# Patient Record
Sex: FEMALE | Race: AMERICAN INDIAN OR ALASKA NATIVE
[De-identification: names, ages, dates, MRNs, and addresses within clinical notes are randomized per-mention and may not be internally consistent; named-entity substitution may affect disease eponyms.]

---

## 2020-02-28 ENCOUNTER — HOSPITAL ENCOUNTER (EMERGENCY)
Dept: HOSPITAL 5 - ED | Age: 30
Discharge: HOME | End: 2020-02-28
Payer: COMMERCIAL

## 2020-02-28 VITALS — SYSTOLIC BLOOD PRESSURE: 112 MMHG | DIASTOLIC BLOOD PRESSURE: 58 MMHG

## 2020-02-28 DIAGNOSIS — R53.1: ICD-10-CM

## 2020-02-28 DIAGNOSIS — R42: Primary | ICD-10-CM

## 2020-02-28 LAB
ALBUMIN SERPL-MCNC: 4 G/DL (ref 3.9–5)
ALT SERPL-CCNC: 16 UNITS/L (ref 7–56)
BASOPHILS # (AUTO): 0 K/MM3 (ref 0–0.1)
BASOPHILS NFR BLD AUTO: 0.5 % (ref 0–1.8)
BUN SERPL-MCNC: 12 MG/DL (ref 7–17)
BUN/CREAT SERPL: 20 %
CALCIUM SERPL-MCNC: 9.1 MG/DL (ref 8.4–10.2)
EOSINOPHIL # BLD AUTO: 0 K/MM3 (ref 0–0.4)
EOSINOPHIL NFR BLD AUTO: 0.9 % (ref 0–4.3)
HCT VFR BLD CALC: 38 % (ref 30.3–42.9)
HEMOLYSIS INDEX: 45
HGB BLD-MCNC: 12.3 GM/DL (ref 10.1–14.3)
LYMPHOCYTES # BLD AUTO: 1.4 K/MM3 (ref 1.2–5.4)
LYMPHOCYTES NFR BLD AUTO: 30.8 % (ref 13.4–35)
MCHC RBC AUTO-ENTMCNC: 33 % (ref 30–34)
MCV RBC AUTO: 88 FL (ref 79–97)
MONOCYTES # (AUTO): 0.3 K/MM3 (ref 0–0.8)
MONOCYTES % (AUTO): 7.2 % (ref 0–7.3)
PLATELET # BLD: 236 K/MM3 (ref 140–440)
RBC # BLD AUTO: 4.33 M/MM3 (ref 3.65–5.03)

## 2020-02-28 PROCEDURE — 93005 ELECTROCARDIOGRAM TRACING: CPT

## 2020-02-28 PROCEDURE — 80053 COMPREHEN METABOLIC PANEL: CPT

## 2020-02-28 PROCEDURE — 84703 CHORIONIC GONADOTROPIN ASSAY: CPT

## 2020-02-28 PROCEDURE — 93010 ELECTROCARDIOGRAM REPORT: CPT

## 2020-02-28 PROCEDURE — 85025 COMPLETE CBC W/AUTO DIFF WBC: CPT

## 2020-02-28 PROCEDURE — 36415 COLL VENOUS BLD VENIPUNCTURE: CPT

## 2020-02-28 PROCEDURE — 99283 EMERGENCY DEPT VISIT LOW MDM: CPT

## 2020-02-28 NOTE — EVENT NOTE
ED Screening Note


ED Screening Note: 


3 days lightheadededness fatigue





This initial assessment/diagnostic orders/clinical plan/treatment(s) is/are 

subject to change based on patients health status, clinical progression and re-

assessment by fellow clinical providers in the ED. Further treatment and workup 

at subsequent clinical providers discretion. Patient/guardian urged not to elope

from the ED as their condition may be serious if not clinically assessed and 

managed. 





Initial orders include: 


labs

## 2020-02-28 NOTE — EMERGENCY DEPARTMENT REPORT
ED General Adult HPI





- General


Chief complaint: Weakness


Stated complaint: WEAK/LIGHT HEADED


Time Seen by Provider: 02/28/20 11:38


Source: patient


Mode of arrival: Ambulatory


Limitations: No Limitations





- History of Present Illness


Initial comments: 





29-year-old American female  presents to the emergency 

department complaining of a few day history of waxing and waning presyncope 

symptoms.  States she been feeling, weak and does not notice that she gets 

little lightheaded and sometimes some dizziness with position change and is 

worried that her electrolytes are off for see if she is anemic.  She reports no 

no bleeding.  Ports no chest pain, palpitations, nausea, vomiting, fever, 

chills, sweats no shortness of breath.  She reports no new


New medications no pre-existing health conditions.  She reports no illicit drug 

use


Radiation: non-radiation


Quality: dull


Consistency: constant


Improves with: none


Worsens with: none


Associated Symptoms: denies: diaphoresis, fever/chills, loss of appetite, 

malaise, nausea/vomiting, shortness of breath, syncope, weakness


Treatments Prior to Arrival: none





- Related Data


                                    Allergies











Allergy/AdvReac Type Severity Reaction Status Date / Time


 


No Known Allergies Allergy   Unverified 02/28/20 11:07














ED Review of Systems


ROS: 


Stated complaint: WEAK/LIGHT HEADED


Other details as noted in HPI





Comment: All other systems reviewed and negative





ED Past Medical Hx





- Past Medical History


Previous Medical History?: No





- Surgical History


Additional Surgical History: csection





- Social History


Smoking Status: Never Smoker


Substance Use Type: None





ED Physical Exam





- General


Limitations: No Limitations


General appearance: alert, in no apparent distress





- Head


Head exam: Present: atraumatic, normocephalic





- Eye


Eye exam: Present: normal appearance, PERRL, EOMI


Pupils: Present: normal accommodation





- ENT


ENT exam: Present: normal exam, normal orophraynx, mucous membranes moist





- Neck


Neck exam: Present: normal inspection, full ROM





- Respiratory


Respiratory exam: Present: normal lung sounds bilaterally.  Absent: respiratory 

distress, wheezes, rales, chest wall tenderness, accessory muscle use





- Cardiovascular


Cardiovascular Exam: Present: regular rate, normal rhythm.  Absent: systolic 

murmur, diastolic murmur, rubs, gallop





- GI/Abdominal


GI/Abdominal exam: Present: soft, normal bowel sounds.  Absent: distended, 

tenderness, hyperactive bowel sounds





- Extremities Exam


Extremities exam: Present: normal inspection





- Back Exam


Back exam: Present: normal inspection.  Absent: CVA tenderness (R), CVA 

tenderness (L)





- Neurological Exam


Neurological exam: Present: alert, oriented X3, CN II-XII intact, normal gait.  

Absent: motor sensory deficit





- Psychiatric


Psychiatric exam: Present: normal affect, normal mood





- Skin


Skin exam: Present: warm, dry, intact, normal color.  Absent: rash





ED Course





                                   Vital Signs











  02/28/20 02/28/20





  11:11 12:23


 


Temperature 99.1 F 


 


Pulse Rate 80 


 


Respiratory 18 18





Rate  


 


Blood Pressure 113/64 


 


O2 Sat by Pulse 100 99





Oximetry  














ED Medical Decision Making





- Lab Data


Result diagrams: 


                                 02/28/20 11:58





                                 02/28/20 11:58








                                   Lab Results











  02/28/20 02/28/20 02/28/20 Range/Units





  11:58 11:58 11:58 


 


WBC  4.5    (4.5-11.0)  K/mm3


 


RBC  4.33    (3.65-5.03)  M/mm3


 


Hgb  12.3    (10.1-14.3)  gm/dl


 


Hct  38.0    (30.3-42.9)  %


 


MCV  88    (79-97)  fl


 


MCH  29    (28-32)  pg


 


MCHC  33    (30-34)  %


 


RDW  13.4    (13.2-15.2)  %


 


Plt Count  236    (140-440)  K/mm3


 


Lymph % (Auto)  30.8    (13.4-35.0)  %


 


Mono % (Auto)  7.2    (0.0-7.3)  %


 


Eos % (Auto)  0.9    (0.0-4.3)  %


 


Baso % (Auto)  0.5    (0.0-1.8)  %


 


Lymph #  1.4    (1.2-5.4)  K/mm3


 


Mono #  0.3    (0.0-0.8)  K/mm3


 


Eos #  0.0    (0.0-0.4)  K/mm3


 


Baso #  0.0    (0.0-0.1)  K/mm3


 


Seg Neutrophils %  60.6    (40.0-70.0)  %


 


Seg Neutrophils #  2.7    (1.8-7.7)  K/mm3


 


Sodium   137   (137-145)  mmol/L


 


Potassium   4.6   (3.6-5.0)  mmol/L


 


Chloride   101.7   ()  mmol/L


 


Carbon Dioxide   21 L   (22-30)  mmol/L


 


Anion Gap   19   mmol/L


 


BUN   12   (7-17)  mg/dL


 


Creatinine   0.6 L   (0.7-1.2)  mg/dL


 


Estimated GFR   > 60   ml/min


 


BUN/Creatinine Ratio   20   %


 


Glucose   87   ()  mg/dL


 


Calcium   9.1   (8.4-10.2)  mg/dL


 


Total Bilirubin   0.40   (0.1-1.2)  mg/dL


 


AST   19   (5-40)  units/L


 


ALT   16   (7-56)  units/L


 


Alkaline Phosphatase   56   ()  units/L


 


Total Protein   7.5   (6.3-8.2)  g/dL


 


Albumin   4.0   (3.9-5)  g/dL


 


Albumin/Globulin Ratio   1.1   %


 


HCG, Qual    Negative  (Negative)  














- EKG Data


When compared to previous EKG there are: no significant change


Interpretation: normal EKG


Critical care attestation.: 


If time is entered above; I have spent that time in minutes in the direct care 

of this critically ill patient, excluding procedure time.








ED Disposition


Clinical Impression: 


 Dizziness





Disposition: DC-01 TO HOME OR SELFCARE


Is pt being admited?: No


Does the pt Need Aspirin: No


Condition: Stable


Instructions:  Dizziness (ED), Vertigo (ED)


Referrals: 


BARBARA GOMEZ MD [Staff Physician] - 3-5 Days

## 2020-07-19 ENCOUNTER — HOSPITAL ENCOUNTER (EMERGENCY)
Dept: HOSPITAL 5 - ED | Age: 30
Discharge: LEFT BEFORE DISCHARGE | End: 2020-07-19
Payer: COMMERCIAL

## 2020-07-19 VITALS — SYSTOLIC BLOOD PRESSURE: 118 MMHG | DIASTOLIC BLOOD PRESSURE: 66 MMHG

## 2020-07-19 DIAGNOSIS — Z53.21: ICD-10-CM

## 2020-07-19 DIAGNOSIS — O20.8: Primary | ICD-10-CM

## 2020-07-19 LAB
BASOPHILS # (AUTO): 0.1 K/MM3 (ref 0–0.1)
BASOPHILS NFR BLD AUTO: 2.1 % (ref 0–1.8)
EOSINOPHIL # BLD AUTO: 0.1 K/MM3 (ref 0–0.4)
EOSINOPHIL NFR BLD AUTO: 1.1 % (ref 0–4.3)
HCT VFR BLD CALC: 38.5 % (ref 30.3–42.9)
HGB BLD-MCNC: 12.5 GM/DL (ref 10.1–14.3)
LYMPHOCYTES # BLD AUTO: 1.3 K/MM3 (ref 1.2–5.4)
LYMPHOCYTES NFR BLD AUTO: 22.4 % (ref 13.4–35)
MCHC RBC AUTO-ENTMCNC: 33 % (ref 30–34)
MCV RBC AUTO: 90 FL (ref 79–97)
MONOCYTES # (AUTO): 0.5 K/MM3 (ref 0–0.8)
MONOCYTES % (AUTO): 8.6 % (ref 0–7.3)
PLATELET # BLD: 277 K/MM3 (ref 140–440)
RBC # BLD AUTO: 4.28 M/MM3 (ref 3.65–5.03)

## 2020-07-19 PROCEDURE — 86900 BLOOD TYPING SEROLOGIC ABO: CPT

## 2020-07-19 PROCEDURE — 36415 COLL VENOUS BLD VENIPUNCTURE: CPT

## 2020-07-19 PROCEDURE — 86901 BLOOD TYPING SEROLOGIC RH(D): CPT

## 2020-07-19 PROCEDURE — 84702 CHORIONIC GONADOTROPIN TEST: CPT

## 2020-07-19 PROCEDURE — 85025 COMPLETE CBC W/AUTO DIFF WBC: CPT

## 2020-07-20 ENCOUNTER — HOSPITAL ENCOUNTER (EMERGENCY)
Dept: HOSPITAL 5 - ED | Age: 30
Discharge: HOME | End: 2020-07-20
Payer: COMMERCIAL

## 2020-07-20 VITALS — SYSTOLIC BLOOD PRESSURE: 118 MMHG | DIASTOLIC BLOOD PRESSURE: 70 MMHG

## 2020-07-20 DIAGNOSIS — Z3A.08: ICD-10-CM

## 2020-07-20 DIAGNOSIS — O20.0: Primary | ICD-10-CM

## 2020-07-20 DIAGNOSIS — Z98.890: ICD-10-CM

## 2020-07-20 LAB
BASOPHILS # (AUTO): 0 K/MM3 (ref 0–0.1)
BASOPHILS NFR BLD AUTO: 0.4 % (ref 0–1.8)
EOSINOPHIL # BLD AUTO: 0 K/MM3 (ref 0–0.4)
EOSINOPHIL NFR BLD AUTO: 0.6 % (ref 0–4.3)
HCT VFR BLD CALC: 40.5 % (ref 30.3–42.9)
HGB BLD-MCNC: 13.3 GM/DL (ref 10.1–14.3)
LYMPHOCYTES # BLD AUTO: 1.4 K/MM3 (ref 1.2–5.4)
LYMPHOCYTES NFR BLD AUTO: 23.9 % (ref 13.4–35)
MCHC RBC AUTO-ENTMCNC: 33 % (ref 30–34)
MCV RBC AUTO: 90 FL (ref 79–97)
MONOCYTES # (AUTO): 0.4 K/MM3 (ref 0–0.8)
MONOCYTES % (AUTO): 6.2 % (ref 0–7.3)
PLATELET # BLD: 273 K/MM3 (ref 140–440)
RBC # BLD AUTO: 4.5 M/MM3 (ref 3.65–5.03)

## 2020-07-20 PROCEDURE — 36415 COLL VENOUS BLD VENIPUNCTURE: CPT

## 2020-07-20 PROCEDURE — 85025 COMPLETE CBC W/AUTO DIFF WBC: CPT

## 2020-07-20 PROCEDURE — 84702 CHORIONIC GONADOTROPIN TEST: CPT

## 2020-07-20 PROCEDURE — 76817 TRANSVAGINAL US OBSTETRIC: CPT

## 2020-07-20 PROCEDURE — 86900 BLOOD TYPING SEROLOGIC ABO: CPT

## 2020-07-20 PROCEDURE — 76801 OB US < 14 WKS SINGLE FETUS: CPT

## 2020-07-20 PROCEDURE — 86901 BLOOD TYPING SEROLOGIC RH(D): CPT

## 2020-07-20 NOTE — ULTRASOUND REPORT
OBSTETRICAL ULTRASOUND



HISTORY: Vaginal bleeding.



FINDINGS: Imaging was performed by transabdominally and endovaginally.



The uterus measures 9.8 x 4.4 x 5.6 cm. The endometrial stripe measures 7 mm in contains a small amou
nt of internal fluid. Negative for intrauterine pregnancy.



The right ovary is not visualized. The left ovary measures 2.1 x 1.1 x 2.2 cm in demonstrate appropri
ate flow. Negative for adnexal mass or fluid.



IMPRESSION:

1. Negative for intrauterine pregnancy.

2. Right ovary not visualized.

3. Negative for adnexal abnormality.



Signer Name: Salo Jones MD 

Signed: 7/20/2020 2:27 PM

Workstation Name: [id number]

## 2020-07-20 NOTE — EMERGENCY DEPARTMENT REPORT
Blank Doc





- Documentation


Documentation: 





30-year-old female that presents with vaginal bleeding and pelvic pain. Stated 

is 8 weeks pregnant.





This initial assessment/diagnostic orders/clinical plan/treatment(s) is/are 

subject to change based on patient's health status, clinical progression and re-

assessment by fellow clinical providers in the ED.  Further treatment and workup

at subsequent clinical providers discretion.  Patient/guardians urged not to 

elope from the ED as their condition may be serious if not clinically assessed 

and managed.  Initial orders include:


1- Patient sent to [facility] for further evaluation and treatment


2- labs


3- UA


4- US OB

## 2020-07-20 NOTE — ULTRASOUND REPORT
OBSTETRICAL ULTRASOUND



HISTORY: Vaginal bleeding.



FINDINGS: Imaging was performed by transabdominally and endovaginally.



The uterus measures 9.8 x 4.4 x 5.6 cm. The endometrial stripe measures 7 mm in contains a small amou
nt of internal fluid. Negative for intrauterine pregnancy.



The right ovary is not visualized. The left ovary measures 2.1 x 1.1 x 2.2 cm in demonstrate appropri
ate flow. Negative for adnexal mass or fluid.



IMPRESSION:

1. Negative for intrauterine pregnancy.

2. Right ovary not visualized.

3. Negative for adnexal abnormality.



Signer Name: Salo Jones MD 

Signed: 7/20/2020 2:27 PM

Workstation Name: [id number]

## 2020-07-20 NOTE — EMERGENCY DEPARTMENT REPORT
ED Pregnancy HPI





- General


Chief complaint: Vaginal Bleeding


Stated complaint: MISCARRIAGE/PAIN/HEADACHE


Time Seen by Provider: 20 13:15


Source: patient


Mode of arrival: Ambulatory


Limitations: No Limitations





- History of Present Illness


Initial comments: 





30-year-old -American female patient presents for the past 4 days.  

Patient states she has had several positive home pregnancy test and believes she

is 8 weeks pregnant.  She denies seeing an OB/GYN for confirmation.  She states 

her last menstrual cycle was 5/3/2020 and that her cycles are very regular.  She

states she is  A0.  She denies any dizziness, shortness of breath, dysu

monserrat/hematuria/urinary frequency, nausea/vomiting/diarrhea, fever/chills/sweats, 

or vaginal discharge.  Patient states she is going through 4-5 pads a day.





- Related Data


                                    Allergies











Allergy/AdvReac Type Severity Reaction Status Date / Time


 


No Known Allergies Allergy   Unverified 20 11:07














ED Review of Systems


ROS: 


Stated complaint: MISCARRIAGE/PAIN/HEADACHE


Other details as noted in HPI





Constitutional: denies: chills, fever, malaise, weakness


Respiratory: denies: shortness of breath


Cardiovascular: denies: chest pain


Gastrointestinal: abdominal pain (Cramping lower abdomen).  denies: nausea, 

vomiting


Genitourinary: denies: urgency, dysuria, frequency, hematuria, discharge


Skin: denies: rash, lesions


Neurological: denies: headache, weakness


Hematological/Lymphatic: denies: easy bleeding, swollen glands





ED Past Medical Hx





- Past Medical History


Previous Medical History?: Yes


Additional medical history: Childbirth by 





- Surgical History


Past Surgical History?: Yes


Additional Surgical History: csection





- Social History


Smoking Status: Unknown if ever smoked


Substance Use Type: None





ED Physical Exam





- General


Limitations: No Limitations


General appearance: alert, in no apparent distress





- Head


Head exam: Present: atraumatic, normocephalic





- Eye


Eye exam: Present: normal appearance.  Absent: scleral icterus





- Neck


Neck exam: Present: normal inspection





- Respiratory


Respiratory exam: Present: normal lung sounds bilaterally.  Absent: respiratory 

distress





- Cardiovascular


Cardiovascular Exam: Present: regular rate, normal rhythm.  Absent: systolic 

murmur, diastolic murmur, rubs, gallop





- GI/Abdominal


GI/Abdominal exam: Present: soft, tenderness (Mild suprapubic), normal bowel 

sounds.  Absent: distended, guarding, rebound, rigid





- Back Exam


Back exam: Absent: CVA tenderness (R), CVA tenderness (L)





- Neurological Exam


Neurological exam: Present: alert, oriented X3





- Psychiatric


Psychiatric exam: Present: normal affect, normal mood





- Skin


Skin exam: Present: warm, dry, intact, normal color.  Absent: rash, cyanosis, 

diaphoretic, erythema, pallor, ecchymosis





ED Medical Decision Making





- Lab Data


Result diagrams: 


                                 20 14:26











                                   Lab Results











  20 Range/Units





  14:26 14:26 14:26 


 


WBC  5.7    (4.5-11.0)  K/mm3


 


RBC  4.50    (3.65-5.03)  M/mm3


 


Hgb  13.3    (10.1-14.3)  gm/dl


 


Hct  40.5    (30.3-42.9)  %


 


MCV  90    (79-97)  fl


 


MCH  30    (28-32)  pg


 


MCHC  33    (30-34)  %


 


RDW  13.5    (13.2-15.2)  %


 


Plt Count  273    (140-440)  K/mm3


 


Lymph % (Auto)  23.9    (13.4-35.0)  %


 


Mono % (Auto)  6.2    (0.0-7.3)  %


 


Eos % (Auto)  0.6    (0.0-4.3)  %


 


Baso % (Auto)  0.4    (0.0-1.8)  %


 


Lymph #  1.4    (1.2-5.4)  K/mm3


 


Mono #  0.4    (0.0-0.8)  K/mm3


 


Eos #  0.0    (0.0-0.4)  K/mm3


 


Baso #  0.0    (0.0-0.1)  K/mm3


 


Seg Neutrophils %  68.9    (40.0-70.0)  %


 


Seg Neutrophils #  3.9    (1.8-7.7)  K/mm3


 


HCG, Quant   261.0 H   (0-4)  mIU/mL


 


Blood Type    A NEGATIVE  














- Radiology Data


Radiology results: report reviewed





HISTORY: Vaginal bleeding. 





FINDINGS: Imaging was performed by transabdominally and endovaginally. 





The uterus measures 9.8 x 4.4 x 5.6 cm. The endometrial stripe measures 7 mm in 

contains a small 


 amount of internal fluid. Negative for intrauterine pregnancy. 





The right ovary is not visualized. The left ovary measures 2.1 x 1.1 x 2.2 cm in

demonstrate 


 appropriate flow. Negative for adnexal mass or fluid. 





IMPRESSION: 


1. Negative for intrauterine pregnancy. 


2. Right ovary not visualized. 


3. Negative for adnexal abnormality. 





- Medical Decision Making





Patient here with vaginal bleeding during pregnancy.  She estimates she is 8 

weeks pregnant, however she has not seen an OB/GYN to this point.  Beta-hCG is 

261.  OB ultrasound is negative for any intrauterine pregnancy at this time.  

Patient declines urinalysis.  Patient instructed to follow-up here in the ED in 

2 days for a recheck of her beta hCG levels.  She is well-appearing, her vitals 

are normal, and she is stable for discharge home.  Patient also instructed to 

follow-up with an OB/GYN in 3 to 5 days.  Strict return precautions were 

discussed in detail with patient who verbalizes understanding.


Critical care attestation.: 


If time is entered above; I have spent that time in minutes in the direct care 

of this critically ill patient, excluding procedure time.








ED Disposition


Clinical Impression: 


 Threatened miscarriage in early pregnancy





Disposition: DC-01 TO HOME OR SELFCARE


Is pt being admited?: No


Condition: Stable


Instructions:  Threatened Miscarriage (ED)


Referrals: 


TYSON HIDALGO MD [Staff Physician] - 3-5 Days